# Patient Record
Sex: MALE | Race: WHITE | Employment: OTHER | URBAN - METROPOLITAN AREA
[De-identification: names, ages, dates, MRNs, and addresses within clinical notes are randomized per-mention and may not be internally consistent; named-entity substitution may affect disease eponyms.]

---

## 2017-05-31 ENCOUNTER — OFFICE VISIT (OUTPATIENT)
Dept: FAMILY MEDICINE CLINIC | Age: 67
End: 2017-05-31

## 2017-05-31 VITALS
HEIGHT: 71 IN | WEIGHT: 188.2 LBS | OXYGEN SATURATION: 98 % | DIASTOLIC BLOOD PRESSURE: 76 MMHG | RESPIRATION RATE: 12 BRPM | BODY MASS INDEX: 26.35 KG/M2 | HEART RATE: 78 BPM | TEMPERATURE: 97.1 F | SYSTOLIC BLOOD PRESSURE: 118 MMHG

## 2017-05-31 DIAGNOSIS — J01.10 ACUTE NON-RECURRENT FRONTAL SINUSITIS: Primary | ICD-10-CM

## 2017-05-31 DIAGNOSIS — R05.9 COUGH: ICD-10-CM

## 2017-05-31 PROCEDURE — 99213 OFFICE O/P EST LOW 20 MIN: CPT | Performed by: NURSE PRACTITIONER

## 2017-05-31 PROCEDURE — 3288F FALL RISK ASSESSMENT DOCD: CPT | Performed by: NURSE PRACTITIONER

## 2017-05-31 PROCEDURE — G8510 SCR DEP NEG, NO PLAN REQD: HCPCS | Performed by: NURSE PRACTITIONER

## 2017-05-31 RX ORDER — AMOXICILLIN 500 MG/1
500 CAPSULE ORAL 2 TIMES DAILY
Qty: 14 CAPSULE | Refills: 0 | Status: SHIPPED | OUTPATIENT
Start: 2017-05-31 | End: 2017-06-07

## 2017-05-31 ASSESSMENT — ENCOUNTER SYMPTOMS
COUGH: 1
NAUSEA: 0
BACK PAIN: 0
CHEST TIGHTNESS: 0
VOMITING: 0
SINUS PRESSURE: 1

## 2017-05-31 ASSESSMENT — PATIENT HEALTH QUESTIONNAIRE - PHQ9
1. LITTLE INTEREST OR PLEASURE IN DOING THINGS: 0
SUM OF ALL RESPONSES TO PHQ QUESTIONS 1-9: 0
2. FEELING DOWN, DEPRESSED OR HOPELESS: 0
SUM OF ALL RESPONSES TO PHQ9 QUESTIONS 1 & 2: 0

## 2017-10-24 ENCOUNTER — OFFICE VISIT (OUTPATIENT)
Dept: FAMILY MEDICINE CLINIC | Age: 67
End: 2017-10-24

## 2017-10-24 VITALS
DIASTOLIC BLOOD PRESSURE: 60 MMHG | SYSTOLIC BLOOD PRESSURE: 104 MMHG | HEART RATE: 76 BPM | BODY MASS INDEX: 25.24 KG/M2 | WEIGHT: 181 LBS

## 2017-10-24 DIAGNOSIS — Z12.5 PROSTATE CANCER SCREENING: ICD-10-CM

## 2017-10-24 DIAGNOSIS — Z13.220 LIPID SCREENING: ICD-10-CM

## 2017-10-24 DIAGNOSIS — Z11.59 NEED FOR HEPATITIS C SCREENING TEST: ICD-10-CM

## 2017-10-24 DIAGNOSIS — K21.00 GASTROESOPHAGEAL REFLUX DISEASE WITH ESOPHAGITIS: Primary | ICD-10-CM

## 2017-10-24 DIAGNOSIS — Z23 NEED FOR PNEUMOCOCCAL VACCINATION: ICD-10-CM

## 2017-10-24 PROCEDURE — 36415 COLL VENOUS BLD VENIPUNCTURE: CPT | Performed by: FAMILY MEDICINE

## 2017-10-24 PROCEDURE — 90732 PPSV23 VACC 2 YRS+ SUBQ/IM: CPT | Performed by: FAMILY MEDICINE

## 2017-10-24 PROCEDURE — 99213 OFFICE O/P EST LOW 20 MIN: CPT | Performed by: FAMILY MEDICINE

## 2017-10-24 PROCEDURE — G0009 ADMIN PNEUMOCOCCAL VACCINE: HCPCS | Performed by: FAMILY MEDICINE

## 2017-10-24 RX ORDER — RANITIDINE 150 MG/1
150 TABLET ORAL 2 TIMES DAILY
Qty: 180 TABLET | Refills: 3 | Status: SHIPPED | OUTPATIENT
Start: 2017-10-24 | End: 2018-10-29 | Stop reason: SDUPTHER

## 2017-10-24 NOTE — PROGRESS NOTES
Subjective:      Patient ID: Lizet Mahoney is a 79 y.o. male. HPI FU for GERD, also some recent intermittent left knee pain. On the porch roof of his stepdaughter for a few weeks, now finally finished with the roof portion. Plebe wrestling - cartilage tear of knee. Energy ok, sleeping not great, for years. No nocturia. Diet - little beef, veggies/fruits, some fast foods, some sweets, pop (8-10 regular oz./day). No FH of prostate CA. Close friend at [facility] with metastatic prostate. Flu shots - defers. Colonoscopy  scheduled in 3 weeks - first one. Review of Systems   Constitutional: Negative for chills and fatigue. Respiratory: Negative for shortness of breath. Cardiovascular: Negative for chest pain. Gastrointestinal: Negative for abdominal pain. Musculoskeletal: Positive for arthralgias. Negative for gait problem. Psychiatric/Behavioral: Negative for dysphoric mood. The patient is not nervous/anxious. Objective:   Physical Exam   Constitutional: He appears well-developed and well-nourished. Cardiovascular: Normal rate, regular rhythm and normal heart sounds. Pulmonary/Chest: Effort normal and breath sounds normal. No respiratory distress. Neurological: He is alert. Nursing note and vitals reviewed. Assessment:      Encounter Diagnoses   Name Primary?  Gastroesophageal reflux disease with esophagitis Yes    Lipid screening     Prostate cancer screening     Need for pneumococcal vaccination     Need for hepatitis C screening test          Plan:      Per orders - await results. GERD stable, continue present medication. If labs stable, and overall feeling well, then repeat office visit in one year, sooner as needed. Advised patient get colonoscopy as scheduled within the next few weeks.

## 2017-10-25 LAB
CHOLESTEROL, TOTAL: 155 MG/DL (ref 0–199)
HDLC SERPL-MCNC: 48 MG/DL (ref 40–60)
HEPATITIS C ANTIBODY INTERPRETATION: NORMAL
LDL CHOLESTEROL CALCULATED: 80 MG/DL
PROSTATE SPECIFIC ANTIGEN: 0.52 NG/ML (ref 0–4)
TRIGL SERPL-MCNC: 137 MG/DL (ref 0–150)
VLDLC SERPL CALC-MCNC: 27 MG/DL

## 2017-10-25 ASSESSMENT — ENCOUNTER SYMPTOMS
SHORTNESS OF BREATH: 0
ABDOMINAL PAIN: 0

## 2018-10-29 ENCOUNTER — OFFICE VISIT (OUTPATIENT)
Dept: FAMILY MEDICINE CLINIC | Age: 68
End: 2018-10-29
Payer: MEDICARE

## 2018-10-29 VITALS
OXYGEN SATURATION: 99 % | WEIGHT: 175.2 LBS | HEART RATE: 64 BPM | SYSTOLIC BLOOD PRESSURE: 130 MMHG | HEIGHT: 71 IN | BODY MASS INDEX: 24.53 KG/M2 | DIASTOLIC BLOOD PRESSURE: 67 MMHG

## 2018-10-29 DIAGNOSIS — K21.00 GASTROESOPHAGEAL REFLUX DISEASE WITH ESOPHAGITIS: ICD-10-CM

## 2018-10-29 PROCEDURE — 99213 OFFICE O/P EST LOW 20 MIN: CPT | Performed by: FAMILY MEDICINE

## 2018-10-29 RX ORDER — RANITIDINE 150 MG/1
150 TABLET ORAL 2 TIMES DAILY
Qty: 180 TABLET | Refills: 3 | Status: SHIPPED | OUTPATIENT
Start: 2018-10-29 | End: 2019-10-28 | Stop reason: SDUPTHER

## 2018-10-29 ASSESSMENT — PATIENT HEALTH QUESTIONNAIRE - PHQ9
2. FEELING DOWN, DEPRESSED OR HOPELESS: 0
SUM OF ALL RESPONSES TO PHQ QUESTIONS 1-9: 0
SUM OF ALL RESPONSES TO PHQ QUESTIONS 1-9: 0
SUM OF ALL RESPONSES TO PHQ9 QUESTIONS 1 & 2: 0
1. LITTLE INTEREST OR PLEASURE IN DOING THINGS: 0

## 2018-10-29 NOTE — PROGRESS NOTES
Subjective:      Patient ID: Nakul Byrd is a 76 y.o. male. HPI   FU for GERD. Lost 20 lbs this year, now within 5 lbs of his goal weight. What has helped is not overeating, so not as much reflux, smaller portions. Occasional GERD overnight - zantac 150, usually twice daily with meals. Certain foods - tomato sauce, also spicey stuff. Looser bowels from it, no other major side effects. Got another summer (August) 2018. ASA for pain only. The 10-year ASCVD risk score (Evie Burgos, et al., 2013) is: 14.3%    Values used to calculate the score:      Age: 76 years      Sex: Male      Is Non- : No      Diabetic: No      Tobacco smoker: No      Systolic Blood Pressure: 495 mmHg      Is BP treated: No      HDL Cholesterol: 48 mg/dL      Total Cholesterol: 155 mg/dL      Review of Systems   Constitutional: Negative for chills and fatigue. Respiratory: Negative for shortness of breath. Cardiovascular: Negative for chest pain. Gastrointestinal: Negative for abdominal pain. Objective:   Physical Exam   Constitutional: He appears well-developed and well-nourished. Cardiovascular: Normal rate, regular rhythm and normal heart sounds. Pulmonary/Chest: Effort normal and breath sounds normal. No respiratory distress. Neurological: He is alert. Psychiatric: He has a normal mood and affect. Nursing note and vitals reviewed. Assessment:      Encounter Diagnosis   Name Primary?  Gastroesophageal reflux disease with esophagitis          Plan:       GERD stable, continue Zantac. If overall feeling well, advise repeat office visit in one year, sooner as needed. Due to elevated CVD risk, advised he consider statin, though patient presently refuses.          Francesca Wilson MD

## 2018-10-30 ASSESSMENT — ENCOUNTER SYMPTOMS
ABDOMINAL PAIN: 0
SHORTNESS OF BREATH: 0

## 2018-11-30 ENCOUNTER — OFFICE VISIT (OUTPATIENT)
Dept: FAMILY MEDICINE CLINIC | Age: 68
End: 2018-11-30
Payer: MEDICARE

## 2018-11-30 VITALS
WEIGHT: 175.6 LBS | SYSTOLIC BLOOD PRESSURE: 122 MMHG | BODY MASS INDEX: 24.58 KG/M2 | OXYGEN SATURATION: 95 % | DIASTOLIC BLOOD PRESSURE: 70 MMHG | HEART RATE: 81 BPM | HEIGHT: 71 IN

## 2018-11-30 DIAGNOSIS — M25.561 ACUTE PAIN OF RIGHT KNEE: ICD-10-CM

## 2018-11-30 DIAGNOSIS — Z01.818 PRE-OP EXAM: Primary | ICD-10-CM

## 2018-11-30 LAB
ANION GAP SERPL CALCULATED.3IONS-SCNC: 9 MMOL/L (ref 3–16)
BILIRUBIN URINE: NEGATIVE
BLOOD, URINE: NEGATIVE
BUN BLDV-MCNC: 16 MG/DL (ref 7–20)
CALCIUM SERPL-MCNC: 9 MG/DL (ref 8.3–10.6)
CHLORIDE BLD-SCNC: 106 MMOL/L (ref 99–110)
CLARITY: CLEAR
CO2: 28 MMOL/L (ref 21–32)
COLOR: YELLOW
CREAT SERPL-MCNC: 1 MG/DL (ref 0.8–1.3)
GFR AFRICAN AMERICAN: >60
GFR NON-AFRICAN AMERICAN: >60
GLUCOSE BLD-MCNC: 67 MG/DL (ref 70–99)
GLUCOSE URINE: NEGATIVE MG/DL
HCT VFR BLD CALC: 43 % (ref 40.5–52.5)
HEMOGLOBIN: 14.2 G/DL (ref 13.5–17.5)
KETONES, URINE: NEGATIVE MG/DL
LEUKOCYTE ESTERASE, URINE: NEGATIVE
MCH RBC QN AUTO: 32.6 PG (ref 26–34)
MCHC RBC AUTO-ENTMCNC: 33.1 G/DL (ref 31–36)
MCV RBC AUTO: 98.5 FL (ref 80–100)
MICROSCOPIC EXAMINATION: NORMAL
NITRITE, URINE: NEGATIVE
PDW BLD-RTO: 13.3 % (ref 12.4–15.4)
PH UA: 6
PLATELET # BLD: 128 K/UL (ref 135–450)
PMV BLD AUTO: 9 FL (ref 5–10.5)
POTASSIUM SERPL-SCNC: 4.7 MMOL/L (ref 3.5–5.1)
PROTEIN UA: NEGATIVE MG/DL
RBC # BLD: 4.37 M/UL (ref 4.2–5.9)
SODIUM BLD-SCNC: 143 MMOL/L (ref 136–145)
SPECIFIC GRAVITY UA: 1.02
URINE TYPE: NORMAL
UROBILINOGEN, URINE: 0.2 E.U./DL
WBC # BLD: 17.6 K/UL (ref 4–11)

## 2018-11-30 PROCEDURE — 93000 ELECTROCARDIOGRAM COMPLETE: CPT | Performed by: PHYSICIAN ASSISTANT

## 2018-11-30 PROCEDURE — 99213 OFFICE O/P EST LOW 20 MIN: CPT | Performed by: PHYSICIAN ASSISTANT

## 2018-11-30 PROCEDURE — 36415 COLL VENOUS BLD VENIPUNCTURE: CPT | Performed by: PHYSICIAN ASSISTANT

## 2018-11-30 PROCEDURE — 81003 URINALYSIS AUTO W/O SCOPE: CPT | Performed by: PHYSICIAN ASSISTANT

## 2018-11-30 NOTE — PROGRESS NOTES
negative  ENDOCRINE:  negative  MUSCULOSKELETAL:  negative  NEUROLOGICAL:  negative    PHYSICAL EXAM:      /70 (Site: Right Upper Arm, Position: Sitting)   Pulse 81   Ht 5' 11\" (1.803 m)   Wt 175 lb 9.6 oz (79.7 kg)   SpO2 95%   BMI 24.49 kg/m²     Eyes:  Lids and lashes normal, pupils equal, round and reactive to light, extra ocular muscles intact, sclera clear, conjunctiva normal  Head/ENT:  Normocephalic, without obvious abnormality, atraumatic, sinuses nontender on palpation, external ears without lesions, oral pharynx with moist mucus membranes, tonsils without erythema or exudates, gums normal and good dentition. Neck:  Supple, symmetrical, trachea midline, no adenopathy, thyroid symmetric, not enlarged and no tenderness, skin normal  Heart:  Normal apical impulse, regular rate and rhythm, normal S1 and S2, no S3 or S4, and no murmur noted  Lungs:  No increased work of breathing, good air exchange, clear to auscultation bilaterally, no crackles or wheezing  Abdomen:  No scars, normal bowel sounds, soft, non-distended, non-tender, no masses palpated, no hepatosplenomegally  Extremities:  No clubbing, cyanosis, or edema      DATA:  EKG: see attached    ASSESSMENT AND PLAN:    1. Patient is a 76 y.o. male with above specified procedure planned on 12/20/18 with Dr. Gordon Burkett at [facility]. 2. Stop asa and nsaid medications 7-10 days prior to procedure. Parish Guzmán is Medically stable for surgery. Report will be faxed.

## 2018-12-02 LAB — URINE CULTURE, ROUTINE: NORMAL

## 2018-12-03 DIAGNOSIS — D72.829 LEUKOCYTOSIS, UNSPECIFIED TYPE: Primary | ICD-10-CM

## 2018-12-12 ENCOUNTER — HOSPITAL ENCOUNTER (OUTPATIENT)
Age: 68
Discharge: HOME | End: 2018-12-12
Payer: MEDICARE

## 2018-12-12 DIAGNOSIS — D72.829 LEUKOCYTOSIS, UNSPECIFIED TYPE: ICD-10-CM

## 2018-12-12 PROCEDURE — 85025 COMPLETE CBC W/AUTO DIFF WBC: CPT

## 2018-12-12 PROCEDURE — 36415 COLL VENOUS BLD VENIPUNCTURE: CPT

## 2018-12-13 LAB
BASOPHILS ABSOLUTE: 0 K/UL (ref 0–0.2)
BASOPHILS RELATIVE PERCENT: 0 %
EOSINOPHILS ABSOLUTE: 0 K/UL (ref 0–0.6)
EOSINOPHILS RELATIVE PERCENT: 0 %
HCT VFR BLD CALC: 42.2 % (ref 40.5–52.5)
HEMATOLOGY PATH CONSULT: NORMAL
HEMATOLOGY PATH CONSULT: YES
HEMOGLOBIN: 14.1 G/DL (ref 13.5–17.5)
LYMPHOCYTES ABSOLUTE: 14.5 K/UL (ref 1–5.1)
LYMPHOCYTES RELATIVE PERCENT: 80 %
MCH RBC QN AUTO: 32.7 PG (ref 26–34)
MCHC RBC AUTO-ENTMCNC: 33.4 G/DL (ref 31–36)
MCV RBC AUTO: 97.9 FL (ref 80–100)
MONOCYTES ABSOLUTE: 0.5 K/UL (ref 0–1.3)
MONOCYTES RELATIVE PERCENT: 3 %
NEUTROPHILS ABSOLUTE: 3.1 K/UL (ref 1.7–7.7)
NEUTROPHILS RELATIVE PERCENT: 17 %
PDW BLD-RTO: 12.8 % (ref 12.4–15.4)
PLATELET # BLD: 130 K/UL (ref 135–450)
PMV BLD AUTO: 9.2 FL (ref 5–10.5)
RBC # BLD: 4.31 M/UL (ref 4.2–5.9)
SMUDGE CELLS: PRESENT
WBC # BLD: 18.1 K/UL (ref 4–11)

## 2018-12-14 ENCOUNTER — TELEPHONE (OUTPATIENT)
Dept: FAMILY MEDICINE CLINIC | Age: 68
End: 2018-12-14

## 2018-12-14 NOTE — TELEPHONE ENCOUNTER
Patient called back regarding the Hematologist.  He can do Monday, his schedule has freed up. Sent to [facility] [place] as well.

## 2019-10-28 ENCOUNTER — OFFICE VISIT (OUTPATIENT)
Dept: FAMILY MEDICINE CLINIC | Age: 69
End: 2019-10-28
Payer: MEDICARE

## 2019-10-28 VITALS
HEIGHT: 71 IN | DIASTOLIC BLOOD PRESSURE: 66 MMHG | BODY MASS INDEX: 23.85 KG/M2 | SYSTOLIC BLOOD PRESSURE: 120 MMHG | WEIGHT: 170.4 LBS | TEMPERATURE: 97.7 F | OXYGEN SATURATION: 99 % | HEART RATE: 53 BPM

## 2019-10-28 DIAGNOSIS — C91.10 CLL (CHRONIC LYMPHOCYTIC LEUKEMIA) (HCC): ICD-10-CM

## 2019-10-28 DIAGNOSIS — K21.00 GASTROESOPHAGEAL REFLUX DISEASE WITH ESOPHAGITIS: Primary | ICD-10-CM

## 2019-10-28 DIAGNOSIS — Z12.5 PROSTATE CANCER SCREENING: ICD-10-CM

## 2019-10-28 DIAGNOSIS — Z13.220 LIPID SCREENING: ICD-10-CM

## 2019-10-28 LAB
CHOLESTEROL, TOTAL: 150 MG/DL (ref 0–199)
HDLC SERPL-MCNC: 56 MG/DL (ref 40–60)
LDL CHOLESTEROL CALCULATED: 83 MG/DL
PROSTATE SPECIFIC ANTIGEN: 0.58 NG/ML (ref 0–4)
TRIGL SERPL-MCNC: 54 MG/DL (ref 0–150)
VLDLC SERPL CALC-MCNC: 11 MG/DL

## 2019-10-28 PROCEDURE — 36415 COLL VENOUS BLD VENIPUNCTURE: CPT | Performed by: FAMILY MEDICINE

## 2019-10-28 PROCEDURE — 99213 OFFICE O/P EST LOW 20 MIN: CPT | Performed by: FAMILY MEDICINE

## 2019-10-28 RX ORDER — RANITIDINE 150 MG/1
150 TABLET ORAL 2 TIMES DAILY
Qty: 180 TABLET | Refills: 3 | Status: SHIPPED | OUTPATIENT
Start: 2019-10-28 | End: 2020-05-27

## 2019-10-28 ASSESSMENT — PATIENT HEALTH QUESTIONNAIRE - PHQ9
SUM OF ALL RESPONSES TO PHQ QUESTIONS 1-9: 0
SUM OF ALL RESPONSES TO PHQ9 QUESTIONS 1 & 2: 0
1. LITTLE INTEREST OR PLEASURE IN DOING THINGS: 0
SUM OF ALL RESPONSES TO PHQ QUESTIONS 1-9: 0
2. FEELING DOWN, DEPRESSED OR HOPELESS: 0

## 2019-10-29 PROBLEM — Z13.220 LIPID SCREENING: Status: ACTIVE | Noted: 2019-10-29

## 2019-10-29 PROBLEM — K21.00 GASTROESOPHAGEAL REFLUX DISEASE WITH ESOPHAGITIS: Status: ACTIVE | Noted: 2019-10-29

## 2019-10-29 PROBLEM — C91.10 CLL (CHRONIC LYMPHOCYTIC LEUKEMIA) (HCC): Status: ACTIVE | Noted: 2019-10-29

## 2019-10-29 ASSESSMENT — ENCOUNTER SYMPTOMS
SHORTNESS OF BREATH: 0
ABDOMINAL PAIN: 0

## 2019-11-04 DIAGNOSIS — Z12.11 COLON CANCER SCREENING: Primary | ICD-10-CM

## 2019-11-04 LAB
CONTROL: NORMAL
HEMOCCULT STL QL: NEGATIVE

## 2019-11-04 PROCEDURE — 82274 ASSAY TEST FOR BLOOD FECAL: CPT | Performed by: FAMILY MEDICINE

## 2020-05-19 ENCOUNTER — TELEPHONE (OUTPATIENT)
Dept: FAMILY MEDICINE CLINIC | Age: 70
End: 2020-05-19

## 2020-05-19 RX ORDER — FAMOTIDINE 20 MG/1
20 TABLET, FILM COATED ORAL 2 TIMES DAILY
Qty: 180 TABLET | Refills: 2 | Status: SHIPPED | OUTPATIENT
Start: 2020-05-19 | End: 2021-01-18 | Stop reason: SDUPTHER

## 2020-05-27 ENCOUNTER — OFFICE VISIT (OUTPATIENT)
Dept: FAMILY MEDICINE CLINIC | Age: 70
End: 2020-05-27
Payer: MEDICARE

## 2020-05-27 VITALS
RESPIRATION RATE: 18 BRPM | HEART RATE: 57 BPM | SYSTOLIC BLOOD PRESSURE: 124 MMHG | OXYGEN SATURATION: 94 % | BODY MASS INDEX: 23.85 KG/M2 | TEMPERATURE: 97.3 F | WEIGHT: 171 LBS | DIASTOLIC BLOOD PRESSURE: 70 MMHG

## 2020-05-27 PROCEDURE — 99213 OFFICE O/P EST LOW 20 MIN: CPT | Performed by: NURSE PRACTITIONER

## 2020-05-27 ASSESSMENT — PATIENT HEALTH QUESTIONNAIRE - PHQ9
SUM OF ALL RESPONSES TO PHQ QUESTIONS 1-9: 0
SUM OF ALL RESPONSES TO PHQ QUESTIONS 1-9: 0
SUM OF ALL RESPONSES TO PHQ9 QUESTIONS 1 & 2: 0
1. LITTLE INTEREST OR PLEASURE IN DOING THINGS: 0
2. FEELING DOWN, DEPRESSED OR HOPELESS: 0

## 2020-05-27 ASSESSMENT — ENCOUNTER SYMPTOMS
TROUBLE SWALLOWING: 0
NAUSEA: 0
CHEST TIGHTNESS: 0
BACK PAIN: 0
DIARRHEA: 0
SHORTNESS OF BREATH: 0
COLOR CHANGE: 1

## 2020-05-27 NOTE — PROGRESS NOTES
meSubjective:      Patient ID: Ania Padilla is a 79 y.o. male. HPI     To establish care at this practice. Previous PCP Dr. Godfrey Fairchild, relocated to the [place]. Cut left thumb on saw Monday and was treated at Urgent Care by Jose. Taking keflex QID and feels is healing. Late in visit states has another problem. Left index finger with splinter from board imbedded about 3 weeks ago. Tried to removed but unable. Painful when touched specific way. Denies redness, drainage. Review of Systems   Constitutional: Negative for chills, fever and unexpected weight change. HENT: Negative for trouble swallowing. Respiratory: Negative for chest tightness and shortness of breath. Cardiovascular: Negative for chest pain and palpitations. Gastrointestinal: Negative for diarrhea and nausea. Genitourinary: Negative for difficulty urinating. Musculoskeletal: Negative for arthralgias, back pain and gait problem. Skin: Positive for color change and wound. Neurological: Negative for dizziness and headaches. Psychiatric/Behavioral: Negative. Objective:   Physical Exam  Constitutional:       General: He is not in acute distress. Appearance: Normal appearance. He is well-developed. Cardiovascular:      Rate and Rhythm: Normal rate and regular rhythm. Heart sounds: Normal heart sounds. Pulmonary:      Effort: Pulmonary effort is normal.      Breath sounds: Normal breath sounds. Abdominal:      General: Bowel sounds are normal.      Palpations: Abdomen is soft. Musculoskeletal: Normal range of motion. Skin:     General: Skin is warm and dry. Comments: Dressing Dry and Intact Left thumb. Left index finger with firm calloused raised area mid finger. White in appearance, tender to palpation. ROM joint intact. Neurological:      Mental Status: He is alert and oriented to person, place, and time. Assessment:      1. CLL-dx 2018, following with Dr. Sylvester Cavanaugh every 3 months.

## 2020-06-05 ENCOUNTER — OFFICE VISIT (OUTPATIENT)
Dept: PRIMARY CARE CLINIC | Age: 70
End: 2020-06-05

## 2020-06-05 ENCOUNTER — TELEPHONE (OUTPATIENT)
Dept: ORTHOPEDIC SURGERY | Age: 70
End: 2020-06-05

## 2020-06-05 ENCOUNTER — OFFICE VISIT (OUTPATIENT)
Dept: ORTHOPEDIC SURGERY | Age: 70
End: 2020-06-05
Payer: MEDICARE

## 2020-06-05 VITALS — WEIGHT: 171 LBS | RESPIRATION RATE: 17 BRPM | HEIGHT: 71 IN | BODY MASS INDEX: 23.94 KG/M2

## 2020-06-05 PROBLEM — S60.459A: Status: ACTIVE | Noted: 2020-06-05

## 2020-06-05 PROBLEM — L08.9: Status: ACTIVE | Noted: 2020-06-05

## 2020-06-05 PROCEDURE — 99203 OFFICE O/P NEW LOW 30 MIN: CPT | Performed by: ORTHOPAEDIC SURGERY

## 2020-06-05 NOTE — TELEPHONE ENCOUNTER
Auth: NPR  Date: 6/10/2020  Reference # [id number]  Spoke with: IVR  Type of SX: Outpatient  Location: [facility] [address]   SX area: [facility]

## 2020-06-05 NOTE — PROGRESS NOTES
healing wound with satisfactory granulation bed, and then over the PIP radial level of the left volar index. This area has some mild redness and an old entrance wound but there is a prominence along the volar PIP area. Palpation: There is tenderness mostly as I palpate over the area of the index volar PIP where I believe there is a prominence which is obliquely oriented across the volar PIP joint line    Range of Motion: There is satisfactory range of motion the finger but tenderness with flexion. Strength: Normal    Special Tests: There is no dramatic pain proximally along the flexor tendon sheath or into the palm of the hand    Skin: There are no additional worrisome rashes, ulcerations or lesions. Gait: normal    Circulation: well perfused        Additional Comments:     Additional Examinations:  Right Upper Extremity:  Examination of the right upper extremity does not show any tenderness, deformity or injury. Range of motion is unremarkable. There is no gross instability. There are no rashes, ulcerations or lesions. Strength and tone are normal.      Radiology:     X-rays obtained and reviewed in office:  Views 3  Location left index  Impression x-rays demonstrate some mild degenerative changes but no signs of any scalloping of the bony cortex or sign of fracture           Assessment: I believe the patient does have a retained deep foreign body in the volar aspect of left index finger and also laceration to the left thumb    Impression:   Encounter Diagnoses   Name Primary?     Finger pain, left Yes    Foreign body in finger-infected, initial encounter        Office Procedures:  Orders Placed This Encounter   Procedures    XR FINGER LEFT (MIN 2 VIEWS)     Standing Status:   Future     Number of Occurrences:   1     Standing Expiration Date:   6/5/2021       Treatment Plan: I believe the left thumb laceration will actually heal very nicely with granulation tissue, but I am continue to be concerned about the deep retained foreign body which is likely a wood splinter at the left index finger. I have informed the patient that this does carry a concern about extension with flexor tendon sheath infections which can develop rapidly and become very concerning. With this in mind I have recommended we plan for outpatient removal of the deep foreign body in the very near future. I do believe this could be done as a straight local procedure. We discussed the risks, benefits, limitations, alternatives, and postop recovery following the proposed procedure. We will begin the process of scheduling surgery if there are no other preoperative medical contraindications. Please note that this transcription was created using voice recognition software. Any errors are unintentional and may be due to voice recognition transcription.

## 2020-06-07 LAB
SARS-COV-2: NOT DETECTED
SOURCE: NORMAL

## 2020-06-08 NOTE — RESULT ENCOUNTER NOTE
Please contact patient with their testing results: Your test for COVID-19, also known as novel coronavirus, came back negative. No virus was detected from the sample collected. Until your symptoms are fully resolved, you may still be contagious. We recommend that you remain isolated for 7 days minimum or 72 hours after your symptoms have completely resolved, whichever is longer. Continually monitor symptoms. Contact a medical provider if symptoms are worsening. If you have any additional questions, contact your PCP.     For additional information, please visit the Centers for Disease Control and Prevention   [email or web address]

## 2020-06-09 ENCOUNTER — TELEPHONE (OUTPATIENT)
Dept: ORTHOPEDIC SURGERY | Age: 70
End: 2020-06-09

## 2021-01-18 ENCOUNTER — OFFICE VISIT (OUTPATIENT)
Dept: FAMILY MEDICINE CLINIC | Age: 71
End: 2021-01-18
Payer: MEDICARE

## 2021-01-18 VITALS
TEMPERATURE: 97.5 F | WEIGHT: 173.8 LBS | RESPIRATION RATE: 16 BRPM | HEART RATE: 50 BPM | BODY MASS INDEX: 24.24 KG/M2 | SYSTOLIC BLOOD PRESSURE: 112 MMHG | OXYGEN SATURATION: 98 % | DIASTOLIC BLOOD PRESSURE: 68 MMHG

## 2021-01-18 DIAGNOSIS — K21.00 GASTROESOPHAGEAL REFLUX DISEASE WITH ESOPHAGITIS WITHOUT HEMORRHAGE: Primary | ICD-10-CM

## 2021-01-18 DIAGNOSIS — C91.10 CLL (CHRONIC LYMPHOCYTIC LEUKEMIA) (HCC): ICD-10-CM

## 2021-01-18 PROCEDURE — 99213 OFFICE O/P EST LOW 20 MIN: CPT | Performed by: NURSE PRACTITIONER

## 2021-01-18 PROCEDURE — G8510 SCR DEP NEG, NO PLAN REQD: HCPCS | Performed by: NURSE PRACTITIONER

## 2021-01-18 RX ORDER — FAMOTIDINE 20 MG/1
20 TABLET, FILM COATED ORAL NIGHTLY
Qty: 90 TABLET | Refills: 3 | Status: SHIPPED | OUTPATIENT
Start: 2021-01-18 | End: 2021-05-27 | Stop reason: SDUPTHER

## 2021-01-18 RX ORDER — OMEPRAZOLE 40 MG/1
40 CAPSULE, DELAYED RELEASE ORAL
Qty: 90 CAPSULE | Refills: 3 | Status: SHIPPED | OUTPATIENT
Start: 2021-01-18 | End: 2021-05-27

## 2021-01-18 ASSESSMENT — PATIENT HEALTH QUESTIONNAIRE - PHQ9
2. FEELING DOWN, DEPRESSED OR HOPELESS: 0
SUM OF ALL RESPONSES TO PHQ QUESTIONS 1-9: 0

## 2021-01-18 NOTE — PROGRESS NOTES
Jada Jimenez (:  1950) is a 79 y.o. male,Established patient, here for evaluation of the following chief complaint(s):  Follow-up      ASSESSMENT/PLAN:  1. Gastroesophageal reflux disease with esophagitis without hemorrhage  -     omeprazole (PRILOSEC) 40 MG delayed release capsule; Take 1 capsule by mouth every morning (before breakfast), Disp-90 capsule, R-3Normal  -     famotidine (PEPCID) 20 MG tablet; Take 1 tablet by mouth nightly, Disp-90 tablet, R-3Normal  2. CLL (chronic lymphocytic leukemia) (HCC)    Avoid caffeine, peppermint, alcohol  Avoid eating within 3 hours of lying down  Limit fatty food, this is harder to digest  Avoid acidy foods such as orange juice, grapefruit, tomato juice products    Return in about 6 months (around 2021) for gerd. SUBJECTIVE/OBJECTIVE:  HPI       For routine follow up. Feeling well. Hx of CLL managed by Dr. Alyssa Wiley. Now following every 6 months. Labs reported to be stable on last office visit. Review of Systems   All other systems reviewed and are negative. Physical Exam  Constitutional:       General: He is not in acute distress. Appearance: Normal appearance. He is well-developed. Cardiovascular:      Rate and Rhythm: Normal rate and regular rhythm. Heart sounds: Normal heart sounds. Pulmonary:      Effort: Pulmonary effort is normal.      Breath sounds: Normal breath sounds. Abdominal:      General: Bowel sounds are normal.      Palpations: Abdomen is soft. Musculoskeletal: Normal range of motion. Skin:     General: Skin is warm and dry. Neurological:      Mental Status: He is alert and oriented to person, place, and time.                  An electronic signature was used to authenticate this note.    --AMERICA HARDEN, APRN - CNP

## 2021-03-16 ENCOUNTER — TELEPHONE (OUTPATIENT)
Dept: FAMILY MEDICINE CLINIC | Age: 71
End: 2021-03-16

## 2021-03-16 ENCOUNTER — OFFICE VISIT (OUTPATIENT)
Dept: FAMILY MEDICINE CLINIC | Age: 71
End: 2021-03-16
Payer: MEDICARE

## 2021-03-16 ENCOUNTER — HOSPITAL ENCOUNTER (OUTPATIENT)
Dept: CT IMAGING | Age: 71
Discharge: HOME | End: 2021-03-16
Payer: MEDICARE

## 2021-03-16 VITALS
WEIGHT: 173.2 LBS | OXYGEN SATURATION: 98 % | RESPIRATION RATE: 16 BRPM | DIASTOLIC BLOOD PRESSURE: 74 MMHG | SYSTOLIC BLOOD PRESSURE: 106 MMHG | HEIGHT: 71 IN | HEART RATE: 77 BPM | TEMPERATURE: 96.9 F | BODY MASS INDEX: 24.25 KG/M2

## 2021-03-16 DIAGNOSIS — R10.31 RIGHT LOWER QUADRANT PAIN: ICD-10-CM

## 2021-03-16 DIAGNOSIS — M54.41 ACUTE RIGHT-SIDED LOW BACK PAIN WITH RIGHT-SIDED SCIATICA: Primary | ICD-10-CM

## 2021-03-16 LAB
BILIRUBIN, POC: NORMAL
BLOOD URINE, POC: NORMAL
CLARITY, POC: CLEAR
COLOR, POC: YELLOW
GFR AFRICAN AMERICAN: >60
GFR NON-AFRICAN AMERICAN: >60
GLUCOSE URINE, POC: NORMAL
KETONES, POC: NORMAL
LEUKOCYTE EST, POC: NORMAL
NITRITE, POC: NORMAL
PERFORMED ON: NORMAL
PH, POC: 6
POC CREATININE: 1 MG/DL (ref 0.8–1.3)
POC SAMPLE TYPE: NORMAL
PROTEIN, POC: NORMAL
SPECIFIC GRAVITY, POC: >=1.03
UROBILINOGEN, POC: 0.2

## 2021-03-16 PROCEDURE — 99214 OFFICE O/P EST MOD 30 MIN: CPT | Performed by: NURSE PRACTITIONER

## 2021-03-16 PROCEDURE — 82565 ASSAY OF CREATININE: CPT

## 2021-03-16 PROCEDURE — 81002 URINALYSIS NONAUTO W/O SCOPE: CPT | Performed by: NURSE PRACTITIONER

## 2021-03-16 PROCEDURE — 74177 CT ABD & PELVIS W/CONTRAST: CPT

## 2021-03-16 PROCEDURE — 6360000004 HC RX CONTRAST MEDICATION: Performed by: NURSE PRACTITIONER

## 2021-03-16 RX ADMIN — IOPAMIDOL 75 ML: 755 INJECTION, SOLUTION INTRAVENOUS at 12:50

## 2021-03-16 RX ADMIN — IOHEXOL 50 ML: 240 INJECTION, SOLUTION INTRATHECAL; INTRAVASCULAR; INTRAVENOUS; ORAL at 12:42

## 2021-03-16 ASSESSMENT — ENCOUNTER SYMPTOMS
RESPIRATORY NEGATIVE: 1
BACK PAIN: 1
CONSTIPATION: 0
NAUSEA: 0
ABDOMINAL DISTENTION: 0
DIARRHEA: 0
ABDOMINAL PAIN: 1
VOMITING: 0
BLOOD IN STOOL: 0
RECTAL PAIN: 0
ANAL BLEEDING: 0

## 2021-03-16 NOTE — TELEPHONE ENCOUNTER
[facility] reporting that CT Abdominal and Pelvis is complete and resulted in imaging tab.

## 2021-03-16 NOTE — PROGRESS NOTES
3/16/2021     Wellington Gonzalez (:  1950) is a 79 y.o. male, here for evaluation of the following medical concerns:    HPI  Pt c/o right lower back pain that radiates into his RLQ and down his right let. Started 3 weeks ago. Pain is constant, but does get sharp pains intermittently. Pain is worse when standing. Denies any urinary symptoms. No known injury. Review of Systems   Constitutional: Negative. Respiratory: Negative. Cardiovascular: Negative. Gastrointestinal: Positive for abdominal pain. Negative for abdominal distention, anal bleeding, blood in stool, constipation, diarrhea, nausea, rectal pain and vomiting. Genitourinary: Negative. Musculoskeletal: Positive for back pain. Negative for gait problem. Skin: Negative. Prior to Visit Medications    Medication Sig Taking? Authorizing Provider   omeprazole (PRILOSEC) 40 MG delayed release capsule Take 1 capsule by mouth every morning (before breakfast) Yes STARLA Monroe CNP   famotidine (PEPCID) 20 MG tablet Take 1 tablet by mouth nightly Yes STARLA Monroe CNP   calcium carbonate (TUMS) 500 MG chewable tablet Take 1 tablet by mouth daily as needed. Historical Provider, MD        Social History     Tobacco Use    Smoking status: Never Smoker    Smokeless tobacco: Never Used   Substance Use Topics    Alcohol use: No     Comment: quit drinking 2001        Vitals:    21 1019   BP: 106/74   Pulse: 77   Resp: 16   Temp: 96.9 °F (36.1 °C)   SpO2: 98%   Weight: 173 lb 3.2 oz (78.6 kg)   Height: 5' 11\" (1.803 m)     Estimated body mass index is 24.16 kg/m² as calculated from the following:    Height as of this encounter: 5' 11\" (1.803 m). Weight as of this encounter: 173 lb 3.2 oz (78.6 kg). Physical Exam  Vitals signs reviewed. Constitutional:       Appearance: Normal appearance. He is normal weight. HENT:      Head: Normocephalic.    Cardiovascular:      Rate and Rhythm: Normal rate and regular rhythm. Pulmonary:      Effort: Pulmonary effort is normal.      Breath sounds: Normal breath sounds. Abdominal:      General: Abdomen is flat. Bowel sounds are normal. There is distension. Palpations: Abdomen is soft. Tenderness: There is abdominal tenderness in the right lower quadrant. There is no right CVA tenderness or left CVA tenderness. Musculoskeletal:      Lumbar back: He exhibits pain and spasm. He exhibits normal range of motion, no tenderness, no bony tenderness, no swelling, no edema, no deformity and no laceration. Skin:     General: Skin is warm and dry. Neurological:      Mental Status: He is alert and oriented to person, place, and time. ASSESSMENT/PLAN:  1. Acute right-sided low back pain with right-sided sciatica  See HPI. Urine dip did not show hematuria. Pain is likely musculoskeletal.  He was also experiencing RLQ pain that was worse upon palpation. Will do STAT abd/pelvis CT to r/o appendicitis. If negative, I would recommend treating the pt with oral prednisone and a muscle relaxer for sciatica. - POCT Urinalysis no Micro    2. Right lower quadrant pain  See #1.     - POCT Urinalysis no Micro  - CT ABDOMEN PELVIS W IV CONTRAST Additional Contrast? Radiologist Recommendation  - CREATININE, SERUM; Future      Return if symptoms worsen or fail to improve. An electronic signature was used to authenticate this note.     --STARLA Amaya - CNP on 3/16/2021 at 10:47 AM

## 2021-03-17 ENCOUNTER — TELEPHONE (OUTPATIENT)
Dept: FAMILY MEDICINE CLINIC | Age: 71
End: 2021-03-17

## 2021-03-17 NOTE — TELEPHONE ENCOUNTER
I called and spoke with the pt. I informed him that I was not able to get in touch with Dr. Genia Brady. He stated that he has an appt with him on 4/12/21, but would call tomorrow to have it moved up. Pt is aware of his CT results and recommendations.

## 2021-03-17 NOTE — TELEPHONE ENCOUNTER
I have personally called Dr. Hector Koroma office at [facility] twice yesterday, and once today to discuss abd/pelvis CT results. Ultimately the pt needs to f/u with Dr. Zahira Edouard regarding results. I called the pt at the number provided. No answer. Left VM to call our office back.

## 2021-04-05 DIAGNOSIS — K21.00 GASTROESOPHAGEAL REFLUX DISEASE WITH ESOPHAGITIS WITHOUT HEMORRHAGE: ICD-10-CM

## 2021-04-05 DIAGNOSIS — R19.5 GUAIAC POSITIVE STOOLS: Primary | ICD-10-CM

## 2021-04-05 RX ORDER — FAMOTIDINE 20 MG/1
20 TABLET, FILM COATED ORAL NIGHTLY
Qty: 90 TABLET | Refills: 3 | OUTPATIENT
Start: 2021-04-05

## 2021-04-05 NOTE — TELEPHONE ENCOUNTER
Refill Request     Last Seen: 1/18/2021    Last Written: 1/18/2021    Next Appointment:   Future Appointments   Date Time Provider [place]   5/27/2021  8:15 AM STARLA Yepez CNP [facility]       Future appointment scheduled      Requested Prescriptions     Pending Prescriptions Disp Refills    famotidine (PEPCID) 20 MG tablet 90 tablet 3     Sig: Take 1 tablet by mouth nightly

## 2021-05-27 ENCOUNTER — OFFICE VISIT (OUTPATIENT)
Dept: FAMILY MEDICINE CLINIC | Age: 71
End: 2021-05-27
Payer: MEDICARE

## 2021-05-27 VITALS
WEIGHT: 174.8 LBS | DIASTOLIC BLOOD PRESSURE: 70 MMHG | BODY MASS INDEX: 25.03 KG/M2 | RESPIRATION RATE: 16 BRPM | OXYGEN SATURATION: 98 % | HEIGHT: 70 IN | SYSTOLIC BLOOD PRESSURE: 110 MMHG | HEART RATE: 85 BPM

## 2021-05-27 DIAGNOSIS — Z12.5 PROSTATE CANCER SCREENING: ICD-10-CM

## 2021-05-27 DIAGNOSIS — K21.00 GASTROESOPHAGEAL REFLUX DISEASE WITH ESOPHAGITIS WITHOUT HEMORRHAGE: ICD-10-CM

## 2021-05-27 DIAGNOSIS — Z12.11 COLON CANCER SCREENING: ICD-10-CM

## 2021-05-27 DIAGNOSIS — Z00.00 ROUTINE GENERAL MEDICAL EXAMINATION AT A HEALTH CARE FACILITY: Primary | ICD-10-CM

## 2021-05-27 PROCEDURE — G0438 PPPS, INITIAL VISIT: HCPCS | Performed by: NURSE PRACTITIONER

## 2021-05-27 RX ORDER — FAMOTIDINE 20 MG/1
20 TABLET, FILM COATED ORAL 2 TIMES DAILY
Qty: 180 TABLET | Refills: 3 | Status: SHIPPED | OUTPATIENT
Start: 2021-05-27 | End: 2022-06-01 | Stop reason: SDUPTHER

## 2021-05-27 ASSESSMENT — PATIENT HEALTH QUESTIONNAIRE - PHQ9
2. FEELING DOWN, DEPRESSED OR HOPELESS: 0
1. LITTLE INTEREST OR PLEASURE IN DOING THINGS: 0
SUM OF ALL RESPONSES TO PHQ QUESTIONS 1-9: 0

## 2021-05-27 NOTE — PATIENT INSTRUCTIONS
Personalized Preventive Plan for Que Mathew - 5/27/2021  Medicare offers a range of preventive health benefits. Some of the tests and screenings are paid in full while other may be subject to a deductible, co-insurance, and/or copay. Some of these benefits include a comprehensive review of your medical history including lifestyle, illnesses that may run in your family, and various assessments and screenings as appropriate. After reviewing your medical record and screening and assessments performed today your provider may have ordered immunizations, labs, imaging, and/or referrals for you. A list of these orders (if applicable) as well as your Preventive Care list are included within your After Visit Summary for your review. Other Preventive Recommendations:    · A preventive eye exam performed by an eye specialist is recommended every 1-2 years to screen for glaucoma; cataracts, macular degeneration, and other eye disorders. · A preventive dental visit is recommended every 6 months. · Try to get at least 150 minutes of exercise per week or 10,000 steps per day on a pedometer . · Order or download the FREE \"Exercise & Physical Activity: Your Everyday Guide\" from [employer]. Call 6-899.769.5506 or search [employer] online. · You need 0976-1000 mg of calcium and 5443-7362 IU of vitamin D per day. It is possible to meet your calcium requirement with diet alone, but a vitamin D supplement is usually necessary to meet this goal.  · When exposed to the sun, use a sunscreen that protects against both UVA and UVB radiation with an SPF of 30 or greater. Reapply every 2 to 3 hours or after sweating, drying off with a towel, or swimming. · Always wear a seat belt when traveling in a car. Always wear a helmet when riding a bicycle or motorcycle.

## 2021-05-27 NOTE — PROGRESS NOTES
Medicare Annual Wellness Visit  Name: Shama Krishnamurthy Date: 2021   MRN: <[id number]> Sex: Male   Age: 70 y.o. Ethnicity: Non-/Non    : 1950 Race: Aaron Doan is here for Medicare AWV    Screenings for behavioral, psychosocial and functional/safety risks, and cognitive dysfunction are all negative except as indicated below. These results, as well as other patient data from the [address] form, are documented in Flowsheets linked to this Encounter. Allergies   Allergen Reactions    Naprosyn [Naproxen] Other (See Comments)     Bad/horrible indigestion    Neosporin [Neomycin-Polymyx-Gramicid] Other (See Comments)     Delayed/prevented healing per patient. Prior to Visit Medications    Medication Sig Taking? Authorizing Provider   famotidine (PEPCID) 20 MG tablet Take 1 tablet by mouth 2 times daily Yes STARLA Lockhart CNP   calcium carbonate (TUMS) 500 MG chewable tablet Take 1 tablet by mouth daily as needed.    Yes Historical Provider, MD         Past Medical History:   Diagnosis Date    Ankle fracture     right    CLL (chronic lymphocytic leukemia) ([facility] [address]     Dr. Michelle Melendez    Dysphagia     GERD (gastroesophageal reflux disease)        Past Surgical History:   Procedure Laterality Date    KNEE ARTHROSCOPY Right 2018    meniscus repair    UPPER GASTROINTESTINAL ENDOSCOPY      UPPER GASTROINTESTINAL ENDOSCOPY  2011    Dr. Oretha Ormond         Family History   Problem Relation Age of Onset    Diabetes Mother     Alzheimer's Disease Mother     High Blood Pressure Mother     Stroke Mother     Diabetes Sister     Stroke Father [de-identified]    Diabetes Paternal Uncle     Asthma Sister     Stroke Paternal Aunt 78    Stroke Paternal Grandmother 80    Cancer Maternal Uncle         lung CA    Cancer Maternal Grandfather         lung CA was smoker too       CareTeam (Including outside providers/suppliers regularly involved in Interventions:  · No Living Will: Advance Care Planning addressed with patient today   · Blank copy provided. Encouraged to complete and provide for entry to health records.      Health Habits/Nutrition:  Health Habits/Nutrition  Do you exercise for at least 20 minutes 2-3 times per week?: (!) No  Have you lost any weight without trying in the past 3 months?: No  Do you eat only one meal per day?: No  Have you seen the dentist within the past year?: Yes  Body mass index: (!) 25.08  Health Habits/Nutrition Interventions:  · Dental exam overdue:  patient encouraged to make appointment with his/her dentist    Hearing/Vision:  No exam data present  Hearing/Vision  Do you or your family notice any trouble with your hearing that hasn't been managed with hearing aids?: No  Do you have difficulty driving, watching TV, or doing any of your daily activities because of your eyesight?: No  Have you had an eye exam within the past year?: (!) No  Hearing/Vision Interventions:  · Vision concerns:  patient encouraged to make appointment with his/her eye specialist    Safety:  Safety  Do you have working smoke detectors?: Yes  Have all throw rugs been removed or fastened?: (!) No  Do you have non-slip mats or surfaces in all bathtubs/showers?: (!) No  Do all of your stairways have a railing or banister?: (!) No  Are your doorways, halls and stairs free of clutter?: Yes  Do you always fasten your seatbelt when you are in a car?: Yes  Safety Interventions:  · Home safety tips provided     Personalized Preventive Plan   Current Health Maintenance Status  Immunization History   Administered Date(s) Administered    Pneumococcal Conjugate 13-valent ([id number]) 10/20/2016    Pneumococcal Polysaccharide ([id number]) 10/24/2017    Td (Adult), 5 Lf Tetanus Toxoid, Pf (Tenivac, Decavac) 08/11/2018    Td, unspecified formulation 07/03/1997    Tdap (Boostrix, Adacel) 08/01/2013        Health Maintenance   Topic Date Due    COVID-19 Vaccine (1) Never done    Shingles Vaccine (1 of 2) Never done   [employer] Visit (AWV)  Never done    Colon Cancer Screen FIT/FOBT  11/04/2020    Flu vaccine (Season Ended) 09/01/2021    Lipid screen  10/28/2024    DTaP/Tdap/Td vaccine (3 - Td) 08/11/2028    Pneumococcal 65+ yrs at Risk Vaccine  Completed    Hepatitis C screen  Completed    Hepatitis A vaccine  Aged Out    Hepatitis B vaccine  Aged Out    Hib vaccine  Aged Out    Meningococcal (ACWY) vaccine  Aged Out     Recommendations for Intercasting Due: see orders and patient instructions/AVS.  . Recommended screening schedule for the next 5-10 years is provided to the patient in written form: see Patient Instructions/AVS.    Zia Kathryn was seen today for medicare awv. Diagnoses and all orders for this visit:    Routine general medical examination at a health care facility    Colon cancer screening  -     POCT Fecal Immunochemical Test (FIT); Future    Gastroesophageal reflux disease with esophagitis without hemorrhage  -     famotidine (PEPCID) 20 MG tablet; Take 1 tablet by mouth 2 times daily    Prostate cancer screening  -     PSA screening           Encouraged to continue with walking, activity.

## 2021-05-28 LAB — PROSTATE SPECIFIC ANTIGEN: 0.54 NG/ML (ref 0–4)

## 2021-08-02 ENCOUNTER — NURSE ONLY (OUTPATIENT)
Dept: FAMILY MEDICINE CLINIC | Age: 71
End: 2021-08-02

## 2021-08-02 DIAGNOSIS — Z12.11 COLON CANCER SCREENING: ICD-10-CM

## 2021-08-02 LAB
CONTROL: ABNORMAL
HEMOCCULT STL QL: POSITIVE

## 2021-08-02 PROCEDURE — 82274 ASSAY TEST FOR BLOOD FECAL: CPT | Performed by: NURSE PRACTITIONER

## 2022-06-01 ENCOUNTER — OFFICE VISIT (OUTPATIENT)
Dept: FAMILY MEDICINE CLINIC | Age: 72
End: 2022-06-01
Payer: MEDICARE

## 2022-06-01 VITALS
HEIGHT: 71 IN | HEART RATE: 74 BPM | SYSTOLIC BLOOD PRESSURE: 110 MMHG | DIASTOLIC BLOOD PRESSURE: 68 MMHG | BODY MASS INDEX: 24.64 KG/M2 | WEIGHT: 176 LBS | RESPIRATION RATE: 18 BRPM | OXYGEN SATURATION: 98 %

## 2022-06-01 DIAGNOSIS — B86 SCABIES: ICD-10-CM

## 2022-06-01 DIAGNOSIS — Z12.5 PROSTATE CANCER SCREENING: ICD-10-CM

## 2022-06-01 DIAGNOSIS — K21.00 GASTROESOPHAGEAL REFLUX DISEASE WITH ESOPHAGITIS WITHOUT HEMORRHAGE: ICD-10-CM

## 2022-06-01 DIAGNOSIS — R19.5 POSITIVE FIT (FECAL IMMUNOCHEMICAL TEST): ICD-10-CM

## 2022-06-01 DIAGNOSIS — C91.10 CLL (CHRONIC LYMPHOCYTIC LEUKEMIA) (HCC): ICD-10-CM

## 2022-06-01 DIAGNOSIS — Z00.00 MEDICARE ANNUAL WELLNESS VISIT, SUBSEQUENT: Primary | ICD-10-CM

## 2022-06-01 LAB
A/G RATIO: 2.6 (ref 1.1–2.2)
ALBUMIN SERPL-MCNC: 4.6 G/DL (ref 3.4–5)
ALP BLD-CCNC: 56 U/L (ref 40–129)
ALT SERPL-CCNC: 14 U/L (ref 10–40)
ANION GAP SERPL CALCULATED.3IONS-SCNC: 14 MMOL/L (ref 3–16)
AST SERPL-CCNC: 19 U/L (ref 15–37)
BILIRUB SERPL-MCNC: 0.6 MG/DL (ref 0–1)
BUN BLDV-MCNC: 14 MG/DL (ref 7–20)
CALCIUM SERPL-MCNC: 8.8 MG/DL (ref 8.3–10.6)
CHLORIDE BLD-SCNC: 105 MMOL/L (ref 99–110)
CO2: 22 MMOL/L (ref 21–32)
CONTROL: NORMAL
CREAT SERPL-MCNC: 1 MG/DL (ref 0.8–1.3)
GFR AFRICAN AMERICAN: >60
GFR NON-AFRICAN AMERICAN: >60
GLUCOSE BLD-MCNC: 96 MG/DL (ref 70–99)
HEMOCCULT STL QL: NEGATIVE
POTASSIUM REFLEX MAGNESIUM: 4.6 MMOL/L (ref 3.5–5.1)
PROSTATE SPECIFIC ANTIGEN: 0.54 NG/ML (ref 0–4)
SODIUM BLD-SCNC: 141 MMOL/L (ref 136–145)
TOTAL PROTEIN: 6.4 G/DL (ref 6.4–8.2)

## 2022-06-01 PROCEDURE — G0439 PPPS, SUBSEQ VISIT: HCPCS | Performed by: NURSE PRACTITIONER

## 2022-06-01 PROCEDURE — 1123F ACP DISCUSS/DSCN MKR DOCD: CPT | Performed by: NURSE PRACTITIONER

## 2022-06-01 PROCEDURE — 82274 ASSAY TEST FOR BLOOD FECAL: CPT | Performed by: NURSE PRACTITIONER

## 2022-06-01 RX ORDER — PERMETHRIN 50 MG/G
CREAM TOPICAL
Qty: 60 G | Refills: 0 | Status: SHIPPED | OUTPATIENT
Start: 2022-06-01 | End: 2022-09-12

## 2022-06-01 RX ORDER — FAMOTIDINE 20 MG/1
20 TABLET, FILM COATED ORAL 2 TIMES DAILY
Qty: 180 TABLET | Refills: 3 | Status: SHIPPED | OUTPATIENT
Start: 2022-06-01

## 2022-06-01 SDOH — ECONOMIC STABILITY: INCOME INSECURITY: IN THE LAST 12 MONTHS, WAS THERE A TIME WHEN YOU WERE NOT ABLE TO PAY THE MORTGAGE OR RENT ON TIME?: NO

## 2022-06-01 SDOH — ECONOMIC STABILITY: HOUSING INSECURITY: IN THE LAST 12 MONTHS, HOW MANY PLACES HAVE YOU LIVED?: 1

## 2022-06-01 SDOH — ECONOMIC STABILITY: TRANSPORTATION INSECURITY
IN THE PAST 12 MONTHS, HAS THE LACK OF TRANSPORTATION KEPT YOU FROM MEDICAL APPOINTMENTS OR FROM GETTING MEDICATIONS?: NO

## 2022-06-01 SDOH — ECONOMIC STABILITY: TRANSPORTATION INSECURITY
IN THE PAST 12 MONTHS, HAS LACK OF TRANSPORTATION KEPT YOU FROM MEETINGS, WORK, OR FROM GETTING THINGS NEEDED FOR DAILY LIVING?: NO

## 2022-06-01 SDOH — ECONOMIC STABILITY: HOUSING INSECURITY
IN THE LAST 12 MONTHS, WAS THERE A TIME WHEN YOU DID NOT HAVE A STEADY PLACE TO SLEEP OR SLEPT IN A SHELTER (INCLUDING NOW)?: NO

## 2022-06-01 SDOH — ECONOMIC STABILITY: FOOD INSECURITY: WITHIN THE PAST 12 MONTHS, YOU WORRIED THAT YOUR FOOD WOULD RUN OUT BEFORE YOU GOT MONEY TO BUY MORE.: NEVER TRUE

## 2022-06-01 SDOH — ECONOMIC STABILITY: FOOD INSECURITY: WITHIN THE PAST 12 MONTHS, THE FOOD YOU BOUGHT JUST DIDN'T LAST AND YOU DIDN'T HAVE MONEY TO GET MORE.: NEVER TRUE

## 2022-06-01 ASSESSMENT — PATIENT HEALTH QUESTIONNAIRE - PHQ9
SUM OF ALL RESPONSES TO PHQ QUESTIONS 1-9: 0
SUM OF ALL RESPONSES TO PHQ9 QUESTIONS 1 & 2: 0
SUM OF ALL RESPONSES TO PHQ QUESTIONS 1-9: 0
2. FEELING DOWN, DEPRESSED OR HOPELESS: 0
1. LITTLE INTEREST OR PLEASURE IN DOING THINGS: 0

## 2022-06-01 ASSESSMENT — SOCIAL DETERMINANTS OF HEALTH (SDOH): HOW HARD IS IT FOR YOU TO PAY FOR THE VERY BASICS LIKE FOOD, HOUSING, MEDICAL CARE, AND HEATING?: NOT HARD AT ALL

## 2022-06-01 ASSESSMENT — LIFESTYLE VARIABLES: HOW OFTEN DO YOU HAVE A DRINK CONTAINING ALCOHOL: NEVER

## 2022-06-01 NOTE — PROGRESS NOTES
Medicare Annual Wellness Visit    Kevin Weber is here for Medicare AWV    Assessment & Plan   Medicare annual wellness visit, subsequent  -     Comprehensive Metabolic Panel w/ Reflex to MG  -     PSA Screening  CLL (chronic lymphocytic leukemia) (HCC)  Gastroesophageal reflux disease with esophagitis without hemorrhage  -     famotidine (PEPCID) 20 MG tablet; Take 1 tablet by mouth 2 times daily, Disp-180 tablet, R-3Normal  -     Comprehensive Metabolic Panel w/ Reflex to MG  Scabies  -     permethrin (ELIMITE) 5 % cream; Apply topically as directed, Disp-60 g, R-0, Normal  Prostate cancer screening  -     PSA Screening  Positive FIT (fecal immunochemical test)  -     POCT Fecal Immunochemical Test (FIT); Future       Agreeable to repeating FIT test. Will consider GI if positive. Recommendations for Preventive Services Due: see orders and patient instructions/AVS.  Recommended screening schedule for the next 5-10 years is provided to the patient in written form: see Patient Instructions/AVS.     Return for Medicare Annual Wellness Visit in 1 year. Subjective   The following acute and/or chronic problems were also addressed today:    He and his wife have had a rash for over 6 months. Wife has VV 1/22 and was px loprox. Started as little bumps and itching. Has used 2-3 OTC anti fungal medications without relief. Feels is involving all of body except face. Eric Atkinson has symptoms as well. He was tx for scabies but did not resolve. Has appt with derm, Dr. Horace Dias, In 2 weeks for routine visit. Had positive FIT test and was referred to GI. However he feels he was [place] the run around\" with who he could see and didn't follow up. Feels positive was related to hemorrhoids. Denies dark stool or BRRB. Following with oncology for CLL. Seen last week, following every 6 months. No tx indicated at this time.      Patient's complete Health Risk Assessment and screening values have been reviewed and are found in [facility]. The following problems were reviewed today and where indicated follow up appointments were made and/or referrals ordered.     Positive Risk Factor Screenings with Interventions:    Fall Risk:  Do you feel unsteady or are you worried about falling? : no  2 or more falls in past year?: no  Fall with injury in past year?: (!) yes     Fall Risk Interventions:    · Home safety tips provided              General Health and ACP:  General  In general, how would you say your health is?: Very Good  In the past 7 days, have you experienced any of the following: New or Increased Pain, New or Increased Fatigue, Loneliness, Social Isolation, Stress or Anger?: (!) Yes  Select all that apply: (!) Anger  Do you get the social and emotional support that you need?: Yes  Do you have a Living Will?: (!) No    Advance Directives     Power of  Living Will ACP-Advance Directive ACP-Power of     Not on File Filed on 09/01/21 Filed Not on File      General Health Risk Interventions:  · No Living Will: reviewed ADL today    Health Habits/Nutrition:     Physical Activity: Inactive    Days of Exercise per Week: 0 days    Minutes of Exercise per Session: 0 min     Have you lost any weight without trying in the past 3 months?: No  Body mass index: 24.54  Have you seen the dentist within the past year?: Yes    Health Habits/Nutrition Interventions:  · Dental exam overdue:  yearly dental exam recommended     Safety:  Do you have working smoke detectors?: Yes  Do you have any tripping hazards - loose or unsecured carpets or rugs?: (!) Yes  Do you have any tripping hazards - clutter in doorways, halls, or stairs?: No  Do you have either shower bars, grab bars, non-slip mats or non-slip surfaces in your shower or bathtub?: Yes  Do all of your stairways have a railing or banister?: Not Applicable  Do you always fasten your seatbelt when you are in a car?: Yes    Safety Interventions:  · Home safety tips provided Objective   Vitals:    06/01/22 0834   BP: 110/68   Site: Right Upper Arm   Position: Sitting   Cuff Size: Medium Adult   Pulse: 74   Resp: 18   SpO2: 98%   Weight: 176 lb (79.8 kg)   Height: 5' 11\" (1.803 m)      Body mass index is 24.55 kg/m². Pulmonary/Chest: clear to auscultation bilaterally- no wheezes, rales or rhonchi, normal air movement, no respiratory distress  Cardiovascular: normal rate, normal S1 and S2, no gallops, intact distal pulses and no carotid bruits    Trunk, hands, extremities with scattered red slightly raised area, linear on fingers. Allergies   Allergen Reactions    Naprosyn [Naproxen] Other (See Comments)     Bad/horrible indigestion    Neosporin [Neomycin-Polymyx-Gramicid] Other (See Comments)     Delayed/prevented healing per patient. Prior to Visit Medications    Medication Sig Taking? Authorizing Provider   permethrin (ELIMITE) 5 % cream Apply topically as directed Yes STARLA Jean CNP   famotidine (PEPCID) 20 MG tablet Take 1 tablet by mouth 2 times daily Yes STARLA Jean CNP   calcium carbonate (TUMS) 500 MG chewable tablet Take 1 tablet by mouth daily as needed.    Yes Historical Provider, MD Miguel (Including outside providers/suppliers regularly involved in providing care):   Patient Care Team:  STARLA Jean CNP as PCP - General (Family Nurse Practitioner)  STARLA Jean CNP as PCP - [facility] Empaneled Provider  Louise Kraft MD as Consulting Physician (Gastroenterology)     Reviewed and updated this visit:  Tobacco  Allergies  Meds  Problems  Med Hx  Surg Hx  Soc Hx  Fam Hx

## 2022-06-01 NOTE — ACP (ADVANCE CARE PLANNING)
Advance Care Planning     Advance Care Planning (ACP) Physician/NP/PA Conversation    Date of Conversation: 6/1/2022  Conducted with: Patient with Decision Making Capacity    Healthcare Decision Maker:      Primary Decision Maker: Darrell Chambers - Spouse - [phone]    Primary Decision Maker: Kinga Mcgee - Brother/Sister - [phone]    Click here to complete Healthcare Decision Makers including selection of the Healthcare Decision Maker Relationship (ie \"Primary\")  Today we documented Decision Maker(s) consistent with Legal Next of Kin hierarchy. Care Preferences:    Hospitalization: \"If your health worsens and it becomes clear that your chance of recovery is unlikely, what would be your preference regarding hospitalization? \"  The patient would prefer hospitalization. Ventilation: \"If you were unable to breath on your own and your chance of recovery was unlikely, what would be your preference about the use of a ventilator (breathing machine) if it was available to you? \"  The patient would desire the use of a ventilator. Resuscitation: \"In the event your heart stopped as a result of an underlying serious health condition, would you want attempts made to restart your heart, or would you prefer a natural death? \"  Yes, attempt to resuscitate.     treatment goals    Conversation Outcomes / Follow-Up Plan:  ACP complete - no further action today  Reviewed DNR/DNI and patient elects Full Code (Attempt Resuscitation)    Length of Voluntary ACP Conversation in minutes:  <16 minutes (Non-Billable)    AMERICA HARDEN, APRN - CNP

## 2022-09-12 ENCOUNTER — OFFICE VISIT (OUTPATIENT)
Dept: FAMILY MEDICINE CLINIC | Age: 72
End: 2022-09-12
Payer: MEDICARE

## 2022-09-12 VITALS
HEART RATE: 63 BPM | OXYGEN SATURATION: 97 % | SYSTOLIC BLOOD PRESSURE: 90 MMHG | RESPIRATION RATE: 18 BRPM | BODY MASS INDEX: 24.27 KG/M2 | WEIGHT: 174 LBS | DIASTOLIC BLOOD PRESSURE: 60 MMHG

## 2022-09-12 DIAGNOSIS — C91.10 CLL (CHRONIC LYMPHOCYTIC LEUKEMIA) (HCC): Primary | ICD-10-CM

## 2022-09-12 PROCEDURE — 99213 OFFICE O/P EST LOW 20 MIN: CPT | Performed by: NURSE PRACTITIONER

## 2022-09-12 PROCEDURE — 1123F ACP DISCUSS/DSCN MKR DOCD: CPT | Performed by: NURSE PRACTITIONER

## 2022-09-12 NOTE — PROGRESS NOTES
Zulay Goldsmith (:  1950) is a 67 y.o. male,Established patient, here for evaluation of the following chief complaint(s): Mass (Right jaw)         ASSESSMENT/PLAN:  1. CLL (chronic lymphocytic leukemia) (HCC)      Feels lump in swelling of a singular lymph gland. If fails to improve, worsens, fevers, chills, nightsweats  or reoccurs will order CT scan for follow up. Agreeable     Following with [facility] for CLL. Last seen in April. No follow-ups on file. Subjective   SUBJECTIVE/OBJECTIVE:  HPI    Noted a large knot under right jaw but has now gone done. No break in the skin. Denies drainage. Mildly tender. Denies fevers chills. Wife noted prior to him noticing. 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 Review of Systems   All other systems reviewed and are negative. Objective   Physical Exam  Constitutional:       Appearance: Normal appearance. Neck:      Comments: Small lump size of thumb nail noted right upper TMJ border. Neg for redness, warmth. Unable to appreciate additional masses. Cardiovascular:      Rate and Rhythm: Normal rate. Pulmonary:      Effort: Pulmonary effort is normal.   Musculoskeletal:         General: Normal range of motion. Neurological:      Mental Status: He is alert.                 An electronic signature was used to authenticate this note.    --STARLA WEBER - CNP

## 2022-11-30 PROBLEM — S60.459A: Status: RESOLVED | Noted: 2020-06-05 | Resolved: 2022-11-30

## 2022-11-30 PROBLEM — L08.9: Status: RESOLVED | Noted: 2020-06-05 | Resolved: 2022-11-30

## 2022-12-10 ENCOUNTER — TELEPHONE (OUTPATIENT)
Dept: FAMILY MEDICINE CLINIC | Age: 72
End: 2022-12-10

## 2022-12-10 NOTE — TELEPHONE ENCOUNTER
On call communication: patient called in complaining of fever, fatigue, cough and congestion. Started Thursday, worsening. Denies soa. Has been taking allergy medication and aspirin   Grandsons (live with him) were sick last week. No one has been tested for flu or covid. Discussed options including symptomatic care- offered to send in [place] [phone] to pharmacy and place flu and covid orders which patient could have done on Monday(would be outside of window for tamiflu at that point). Pt elected to try little clinic/urgent care today for testing.

## 2023-06-01 DIAGNOSIS — K21.00 GASTROESOPHAGEAL REFLUX DISEASE WITH ESOPHAGITIS WITHOUT HEMORRHAGE: ICD-10-CM

## 2023-06-01 RX ORDER — FAMOTIDINE 20 MG/1
TABLET, FILM COATED ORAL
Qty: 180 TABLET | Refills: 3 | Status: SHIPPED | OUTPATIENT
Start: 2023-06-01

## 2023-06-01 NOTE — TELEPHONE ENCOUNTER
Refill Request     CONFIRM preferred pharmacy with the patient. If Mail Order Rx - Pend for 90 day refill. Last Seen: Last Seen Department: 9/12/2022  Last Seen by PCP: 9/12/2022    Last Written: 6/01/2022, #180, 3 refills    If no future appointment scheduled:  Review the last OV with PCP and review information for follow-up visit,  Route STAFF MESSAGE with patient name to the [facility] for scheduling with the following information:            -  Timing of next visit           -  Visit type ie Physical, OV, etc           -  Diagnoses/Reason ie. COPD, HTN - Do not use MEDICATION, Follow-up or CHECK UP - Give reason for visit      Next Appointment:   Future Appointments   Date Time Provider [facility] [address]   6/2/2023  8:00 AM STARLA Agosto - ELIZABETH ELLISON       Message sent to [address] [place] [address] to schedule appt with patient?   N/A      Requested Prescriptions     Pending Prescriptions Disp Refills    famotidine (PEPCID) 20 MG tablet [Pharmacy Med Name: FAMOTIDINE 20 MG TABLET] 180 tablet 3     Sig: TAKE ONE TABLET BY MOUTH TWICE A DAY

## 2023-06-02 ENCOUNTER — OFFICE VISIT (OUTPATIENT)
Dept: FAMILY MEDICINE CLINIC | Age: 73
End: 2023-06-02

## 2023-06-02 VITALS
HEART RATE: 69 BPM | HEIGHT: 71 IN | WEIGHT: 175 LBS | DIASTOLIC BLOOD PRESSURE: 80 MMHG | BODY MASS INDEX: 24.5 KG/M2 | OXYGEN SATURATION: 99 % | SYSTOLIC BLOOD PRESSURE: 110 MMHG | RESPIRATION RATE: 18 BRPM

## 2023-06-02 DIAGNOSIS — K21.00 GASTROESOPHAGEAL REFLUX DISEASE WITH ESOPHAGITIS WITHOUT HEMORRHAGE: ICD-10-CM

## 2023-06-02 DIAGNOSIS — Z12.11 COLON CANCER SCREENING: ICD-10-CM

## 2023-06-02 DIAGNOSIS — Z12.5 PROSTATE CANCER SCREENING: ICD-10-CM

## 2023-06-02 DIAGNOSIS — Z00.00 MEDICARE ANNUAL WELLNESS VISIT, SUBSEQUENT: Primary | ICD-10-CM

## 2023-06-02 DIAGNOSIS — C91.10 CLL (CHRONIC LYMPHOCYTIC LEUKEMIA) (HCC): ICD-10-CM

## 2023-06-02 LAB
ALBUMIN SERPL-MCNC: 4.5 G/DL (ref 3.4–5)
ALBUMIN/GLOB SERPL: 2.6 {RATIO} (ref 1.1–2.2)
ALP SERPL-CCNC: 57 U/L (ref 40–129)
ALT SERPL-CCNC: 16 U/L (ref 10–40)
ANION GAP SERPL CALCULATED.3IONS-SCNC: 13 MMOL/L (ref 3–16)
AST SERPL-CCNC: 26 U/L (ref 15–37)
BILIRUB SERPL-MCNC: 0.8 MG/DL (ref 0–1)
BUN SERPL-MCNC: 14 MG/DL (ref 7–20)
CALCIUM SERPL-MCNC: 9 MG/DL (ref 8.3–10.6)
CHLORIDE SERPL-SCNC: 107 MMOL/L (ref 99–110)
CO2 SERPL-SCNC: 26 MMOL/L (ref 21–32)
CREAT SERPL-MCNC: 0.9 MG/DL (ref 0.8–1.3)
GFR SERPLBLD CREATININE-BSD FMLA CKD-EPI: >60 ML/MIN/{1.73_M2}
GLUCOSE SERPL-MCNC: 72 MG/DL (ref 70–99)
POTASSIUM SERPL-SCNC: 4.2 MMOL/L (ref 3.5–5.1)
PROT SERPL-MCNC: 6.2 G/DL (ref 6.4–8.2)
PSA SERPL DL<=0.01 NG/ML-MCNC: 1 NG/ML (ref 0–4)
SODIUM SERPL-SCNC: 146 MMOL/L (ref 136–145)

## 2023-06-02 SDOH — ECONOMIC STABILITY: FOOD INSECURITY: WITHIN THE PAST 12 MONTHS, YOU WORRIED THAT YOUR FOOD WOULD RUN OUT BEFORE YOU GOT MONEY TO BUY MORE.: NEVER TRUE

## 2023-06-02 SDOH — ECONOMIC STABILITY: FOOD INSECURITY: WITHIN THE PAST 12 MONTHS, THE FOOD YOU BOUGHT JUST DIDN'T LAST AND YOU DIDN'T HAVE MONEY TO GET MORE.: NEVER TRUE

## 2023-06-02 SDOH — ECONOMIC STABILITY: INCOME INSECURITY: HOW HARD IS IT FOR YOU TO PAY FOR THE VERY BASICS LIKE FOOD, HOUSING, MEDICAL CARE, AND HEATING?: NOT HARD AT ALL

## 2023-06-02 ASSESSMENT — PATIENT HEALTH QUESTIONNAIRE - PHQ9
2. FEELING DOWN, DEPRESSED OR HOPELESS: 0
SUM OF ALL RESPONSES TO PHQ QUESTIONS 1-9: 0
SUM OF ALL RESPONSES TO PHQ9 QUESTIONS 1 & 2: 0
SUM OF ALL RESPONSES TO PHQ QUESTIONS 1-9: 0
1. LITTLE INTEREST OR PLEASURE IN DOING THINGS: 0
SUM OF ALL RESPONSES TO PHQ QUESTIONS 1-9: 0
SUM OF ALL RESPONSES TO PHQ QUESTIONS 1-9: 0

## 2023-06-02 ASSESSMENT — LIFESTYLE VARIABLES
HOW MANY STANDARD DRINKS CONTAINING ALCOHOL DO YOU HAVE ON A TYPICAL DAY: PATIENT DOES NOT DRINK
HOW OFTEN DO YOU HAVE A DRINK CONTAINING ALCOHOL: NEVER

## 2023-06-02 NOTE — PROGRESS NOTES
Medicare Annual Wellness Visit    Matty Wilson is here for Medicare AWV    Assessment & Plan   Medicare annual wellness visit, subsequent  CLL (chronic lymphocytic leukemia) ([facility] [address]  Prostate cancer screening  -     PSA Screening; Future  Gastroesophageal reflux disease with esophagitis without hemorrhage  -     Comprehensive Metabolic Panel; Future  Colon cancer screening  -     Fecal DNA Colorectal cancer screening ([place])        Recommendations for Preventive Services Due: see orders and patient instructions/AVS.  Recommended screening schedule for the next 5-10 years is provided to the patient in written form: see Patient Instructions/AVS.     Return in about 1 year (around 6/2/2024) for AWV. Subjective       Following with [facility] for CLL, last seen 5/30/2023. Felt to be doing well. Patient's complete Health Risk Assessment and screening values have been reviewed and are found in Flowsheets. The following problems were reviewed today and where indicated follow up appointments were made and/or referrals ordered. Positive Risk Factor Screenings with Interventions:    Fall Risk:  Do you feel unsteady or are you worried about falling? : no  2 or more falls in past year?: no  Fall with injury in past year?: (!) yes     Interventions:    Aware of safety            General HRA Questions:  Select all that apply: (!) Anger    Anger Interventions:  stable                             Objective   Vitals:    06/02/23 0802   BP: 110/80   Site: Right Upper Arm   Position: Sitting   Cuff Size: Medium Adult   Pulse: 69   Resp: 18   SpO2: 99%   Weight: 175 lb (79.4 kg)   Height: 5' 11\" (1.803 m)      Body mass index is 24.41 kg/m². Allergies   Allergen Reactions    Naprosyn [Naproxen] Other (See Comments)     Bad/horrible indigestion    Neosporin [Neomycin-Polymyx-Gramicid] Other (See Comments)     Delayed/prevented healing per patient. Prior to Visit Medications    Medication Sig Taking?

## 2023-07-12 LAB — NONINV COLON CA DNA+OCC BLD SCRN STL QL: NEGATIVE

## 2024-05-31 DIAGNOSIS — K21.00 GASTROESOPHAGEAL REFLUX DISEASE WITH ESOPHAGITIS WITHOUT HEMORRHAGE: ICD-10-CM

## 2024-06-01 NOTE — TELEPHONE ENCOUNTER
Refill Request     CONFIRM preferred pharmacy with the patient.    If Mail Order Rx - Pend for 90 day refill.      Last Seen: Last Seen Department: 6/2/2023  Last Seen by PCP: 6/2/2023    Last Written: 6/1/2023    If no future appointment scheduled:  Review the last OV with PCP and review information for follow-up visit,  Route STAFF MESSAGE with patient name to the  Pool for scheduling with the following information:            -  Timing of next visit           -  Visit type ie Physical, OV, etc           -  Diagnoses/Reason ie. COPD, HTN - Do not use MEDICATION, Follow-up or CHECK UP - Give reason for visit      Next Appointment:   Future Appointments   Date Time Provider Department Center   6/13/2024  8:30 AM Teresa Aceves, STARLA - CNP [facility]  [place] [facility]       Message sent to  to schedule appt with patient?  NO      Requested Prescriptions     Pending Prescriptions Disp Refills    famotidine (PEPCID) 20 MG tablet [Pharmacy Med Name: FAMOTIDINE 20 MG TABLET] 180 tablet 3     Sig: TAKE 1 TABLET BY MOUTH TWICE A DAY

## 2024-06-03 RX ORDER — FAMOTIDINE 20 MG/1
20 TABLET, FILM COATED ORAL 2 TIMES DAILY
Qty: 180 TABLET | Refills: 0 | Status: SHIPPED | OUTPATIENT
Start: 2024-06-03

## 2024-06-13 ENCOUNTER — OFFICE VISIT (OUTPATIENT)
Dept: FAMILY MEDICINE CLINIC | Age: 74
End: 2024-06-13
Payer: MEDICARE

## 2024-06-13 VITALS
DIASTOLIC BLOOD PRESSURE: 60 MMHG | WEIGHT: 178.2 LBS | OXYGEN SATURATION: 99 % | SYSTOLIC BLOOD PRESSURE: 110 MMHG | HEIGHT: 70 IN | HEART RATE: 61 BPM | BODY MASS INDEX: 25.51 KG/M2 | RESPIRATION RATE: 18 BRPM

## 2024-06-13 DIAGNOSIS — Z12.5 PROSTATE CANCER SCREENING: ICD-10-CM

## 2024-06-13 DIAGNOSIS — K21.00 GASTROESOPHAGEAL REFLUX DISEASE WITH ESOPHAGITIS WITHOUT HEMORRHAGE: ICD-10-CM

## 2024-06-13 DIAGNOSIS — C91.10 CLL (CHRONIC LYMPHOCYTIC LEUKEMIA) (HCC): ICD-10-CM

## 2024-06-13 DIAGNOSIS — Z00.00 MEDICARE ANNUAL WELLNESS VISIT, SUBSEQUENT: Primary | ICD-10-CM

## 2024-06-13 LAB
ALBUMIN SERPL-MCNC: 4.4 G/DL (ref 3.4–5)
ALBUMIN/GLOB SERPL: 2.3 {RATIO} (ref 1.1–2.2)
ALP SERPL-CCNC: 64 U/L (ref 40–129)
ALT SERPL-CCNC: 18 U/L (ref 10–40)
ANION GAP SERPL CALCULATED.3IONS-SCNC: 12 MMOL/L (ref 3–16)
AST SERPL-CCNC: 23 U/L (ref 15–37)
BILIRUB SERPL-MCNC: 0.5 MG/DL (ref 0–1)
BUN SERPL-MCNC: 16 MG/DL (ref 7–20)
CALCIUM SERPL-MCNC: 9.1 MG/DL (ref 8.3–10.6)
CHLORIDE SERPL-SCNC: 106 MMOL/L (ref 99–110)
CO2 SERPL-SCNC: 26 MMOL/L (ref 21–32)
CREAT SERPL-MCNC: 0.9 MG/DL (ref 0.8–1.3)
GFR SERPLBLD CREATININE-BSD FMLA CKD-EPI: 90 ML/MIN/{1.73_M2}
GLUCOSE SERPL-MCNC: 83 MG/DL (ref 70–99)
POTASSIUM SERPL-SCNC: 4.4 MMOL/L (ref 3.5–5.1)
PROT SERPL-MCNC: 6.3 G/DL (ref 6.4–8.2)
PSA SERPL DL<=0.01 NG/ML-MCNC: 0.78 NG/ML (ref 0–4)
SODIUM SERPL-SCNC: 144 MMOL/L (ref 136–145)

## 2024-06-13 PROCEDURE — 1123F ACP DISCUSS/DSCN MKR DOCD: CPT | Performed by: NURSE PRACTITIONER

## 2024-06-13 PROCEDURE — G0439 PPPS, SUBSEQ VISIT: HCPCS | Performed by: NURSE PRACTITIONER

## 2024-06-13 RX ORDER — FAMOTIDINE 20 MG/1
20 TABLET, FILM COATED ORAL 2 TIMES DAILY
Qty: 180 TABLET | Refills: 3 | Status: SHIPPED | OUTPATIENT
Start: 2024-06-13

## 2024-06-13 SDOH — ECONOMIC STABILITY: FOOD INSECURITY: WITHIN THE PAST 12 MONTHS, YOU WORRIED THAT YOUR FOOD WOULD RUN OUT BEFORE YOU GOT MONEY TO BUY MORE.: NEVER TRUE

## 2024-06-13 SDOH — ECONOMIC STABILITY: FOOD INSECURITY: WITHIN THE PAST 12 MONTHS, THE FOOD YOU BOUGHT JUST DIDN'T LAST AND YOU DIDN'T HAVE MONEY TO GET MORE.: NEVER TRUE

## 2024-06-13 SDOH — ECONOMIC STABILITY: INCOME INSECURITY: HOW HARD IS IT FOR YOU TO PAY FOR THE VERY BASICS LIKE FOOD, HOUSING, MEDICAL CARE, AND HEATING?: NOT HARD AT ALL

## 2024-06-13 ASSESSMENT — LIFESTYLE VARIABLES
HOW OFTEN DO YOU HAVE A DRINK CONTAINING ALCOHOL: NEVER
HOW MANY STANDARD DRINKS CONTAINING ALCOHOL DO YOU HAVE ON A TYPICAL DAY: PATIENT DOES NOT DRINK

## 2024-06-13 ASSESSMENT — PATIENT HEALTH QUESTIONNAIRE - PHQ9
1. LITTLE INTEREST OR PLEASURE IN DOING THINGS: NOT AT ALL
SUM OF ALL RESPONSES TO PHQ QUESTIONS 1-9: 0
2. FEELING DOWN, DEPRESSED OR HOPELESS: NOT AT ALL
SUM OF ALL RESPONSES TO PHQ9 QUESTIONS 1 & 2: 0

## 2024-06-13 NOTE — PATIENT INSTRUCTIONS
and not smoking or using tobacco. It also includes taking medicines as directed, managing other health conditions, and trying to get a healthy amount of sleep.  Follow-up care is a key part of your treatment and safety. Be sure to make and go to all appointments, and call your doctor if you are having problems. It's also a good idea to know your test results and keep a list of the medicines you take.  How can you care for yourself at home?  Diet    Use less salt when you cook and eat. This helps lower your blood pressure. Taste food before salting. Add only a little salt when you think you need it. With time, your taste buds will adjust to less salt.     Eat fewer snack items, fast foods, canned soups, and other high-salt, high-fat, processed foods.     Read food labels and try to avoid saturated and trans fats. They increase your risk of heart disease by raising cholesterol levels.     Limit the amount of solid fat--butter, margarine, and shortening--you eat. Use olive, peanut, or canola oil when you cook. Bake, broil, and steam foods instead of frying them.     Eat a variety of fruit and vegetables every day. Dark green, deep orange, red, or yellow fruits and vegetables are especially good for you. Examples include spinach, carrots, peaches, and berries.     Foods high in fiber can reduce your cholesterol and provide important vitamins and minerals. High-fiber foods include whole-grain cereals and breads, oatmeal, beans, brown rice, citrus fruits, and apples.     Eat lean proteins. Heart-healthy proteins include seafood, lean meats and poultry, eggs, beans, peas, nuts, seeds, and soy products.     Limit drinks and foods with added sugar. These include candy, desserts, and soda pop.   Heart-healthy lifestyle    If your doctor recommends it, get more exercise. For many people, walking is a good choice. Or you may want to swim, bike, or do other activities. Bit by bit, increase the time you're active every day. Try

## 2024-06-13 NOTE — PROGRESS NOTES
Position: Sitting   Cuff Size: Medium Adult   Pulse: 61   Resp: 18   SpO2: 99%   Weight: 80.8 kg (178 lb 3.2 oz)   Height: 1.778 m (5' 10\")      Body mass index is 25.57 kg/m².             Allergies   Allergen Reactions    Naprosyn [Naproxen] Other (See Comments)     Bad/horrible indigestion    Neosporin [Neomycin-Polymyx-Gramicid] Other (See Comments)     Delayed/prevented healing per patient.     Prior to Visit Medications    Medication Sig Taking? Authorizing Provider   famotidine (PEPCID) 20 MG tablet Take 1 tablet by mouth 2 times daily Yes Teresa Aceves APRN - CNP       CareTeam (Including outside providers/suppliers regularly involved in providing care):   Patient Care Team:  Teresa Aceves APRN - CNP as PCP - General (Family Nurse Practitioner)  Teresa Aceves APRN - CNP as PCP - Empaneled Provider  Rosalio Garcia MD as Consulting Physician (Gastroenterology)     Reviewed and updated this visit:  Tobacco  Allergies  Meds  Problems  Med Hx  Surg Hx  Soc Hx  Fam Hx

## 2025-06-16 ENCOUNTER — OFFICE VISIT (OUTPATIENT)
Dept: FAMILY MEDICINE CLINIC | Age: 75
End: 2025-06-16
Payer: MEDICARE

## 2025-06-16 VITALS
BODY MASS INDEX: 25.57 KG/M2 | HEART RATE: 65 BPM | RESPIRATION RATE: 18 BRPM | HEIGHT: 70 IN | OXYGEN SATURATION: 98 % | SYSTOLIC BLOOD PRESSURE: 118 MMHG | DIASTOLIC BLOOD PRESSURE: 60 MMHG | WEIGHT: 178.6 LBS

## 2025-06-16 DIAGNOSIS — Z12.5 PROSTATE CANCER SCREENING: ICD-10-CM

## 2025-06-16 DIAGNOSIS — K21.00 GASTROESOPHAGEAL REFLUX DISEASE WITH ESOPHAGITIS WITHOUT HEMORRHAGE: ICD-10-CM

## 2025-06-16 DIAGNOSIS — M25.841 CYST OF JOINT OF RIGHT HAND: ICD-10-CM

## 2025-06-16 DIAGNOSIS — E78.01 FAMILIAL HYPERCHOLESTEROLEMIA: ICD-10-CM

## 2025-06-16 DIAGNOSIS — Z00.00 MEDICARE ANNUAL WELLNESS VISIT, SUBSEQUENT: Primary | ICD-10-CM

## 2025-06-16 DIAGNOSIS — C91.10 CLL (CHRONIC LYMPHOCYTIC LEUKEMIA) (HCC): ICD-10-CM

## 2025-06-16 PROCEDURE — 1123F ACP DISCUSS/DSCN MKR DOCD: CPT | Performed by: NURSE PRACTITIONER

## 2025-06-16 PROCEDURE — G0439 PPPS, SUBSEQ VISIT: HCPCS | Performed by: NURSE PRACTITIONER

## 2025-06-16 PROCEDURE — 1159F MED LIST DOCD IN RCRD: CPT | Performed by: NURSE PRACTITIONER

## 2025-06-16 PROCEDURE — 1160F RVW MEDS BY RX/DR IN RCRD: CPT | Performed by: NURSE PRACTITIONER

## 2025-06-16 RX ORDER — PANTOPRAZOLE SODIUM 40 MG/1
40 TABLET, DELAYED RELEASE ORAL
Qty: 90 TABLET | Refills: 3 | Status: SHIPPED | OUTPATIENT
Start: 2025-06-16

## 2025-06-16 SDOH — ECONOMIC STABILITY: FOOD INSECURITY: WITHIN THE PAST 12 MONTHS, YOU WORRIED THAT YOUR FOOD WOULD RUN OUT BEFORE YOU GOT MONEY TO BUY MORE.: NEVER TRUE

## 2025-06-16 SDOH — ECONOMIC STABILITY: FOOD INSECURITY: WITHIN THE PAST 12 MONTHS, THE FOOD YOU BOUGHT JUST DIDN'T LAST AND YOU DIDN'T HAVE MONEY TO GET MORE.: NEVER TRUE

## 2025-06-16 ASSESSMENT — PATIENT HEALTH QUESTIONNAIRE - PHQ9
DEPRESSION UNABLE TO ASSESS: FUNCTIONAL CAPACITY MOTIVATION LIMITS ACCURACY
1. LITTLE INTEREST OR PLEASURE IN DOING THINGS: NOT AT ALL
SUM OF ALL RESPONSES TO PHQ QUESTIONS 1-9: 0
SUM OF ALL RESPONSES TO PHQ QUESTIONS 1-9: 0
2. FEELING DOWN, DEPRESSED OR HOPELESS: NOT AT ALL
SUM OF ALL RESPONSES TO PHQ QUESTIONS 1-9: 0
SUM OF ALL RESPONSES TO PHQ QUESTIONS 1-9: 0

## 2025-06-16 NOTE — PROGRESS NOTES
Medicare Annual Wellness Visit    Stephane Patton is here for Medicare AWV and Cyst (Knot on R thumb x few months, painful at times.)    Assessment & Plan   Medicare annual wellness visit, subsequent  CLL (chronic lymphocytic leukemia) (HCC)  Gastroesophageal reflux disease with esophagitis without hemorrhage  -     pantoprazole (PROTONIX) 40 MG tablet; Take 1 tablet by mouth every morning (before breakfast), Disp-90 tablet, R-3Normal  -     Comprehensive Metabolic Panel; Future  Prostate cancer screening  -     PSA Screening; Future  Familial hypercholesterolemia  -     Lipid Panel; Future  -     Comprehensive Metabolic Panel; Future  Cyst of joint of right hand  -     Claudia Peterson MD, Orthopedic Surgery (Hand, Wrist), [facility]    Following with [facility] every 6 months, Dr. Quinn.      Return in about 1 year (around 6/16/2026) for AWV.     Subjective       Several months ago with bump right thumb distal joint. At times painful to touch.     Taking pepcid 20 mg BID and still with nighttime reflux.     Patient's complete Health Risk Assessment and screening values have been reviewed and are found in Flowsheets. The following problems were reviewed today and where indicated follow up appointments were made and/or referrals ordered.    Positive Risk Factor Screenings with Interventions:      Depression:  Depression Unable to Assess: Functional capacity motivation limits accuracy  PHQ-2 Score: 0  PHQ-9 Total Score: 0             General HRA Questions:  Select all that apply: (!) New or Increased Fatigue  Interventions Fatigue:  stable      Inactivity:  On average, how many days per week do you engage in moderate to strenuous exercise (like a brisk walk)?: 0 days (!) Abnormal  On average, how many minutes do you engage in exercise at this level?: 0 min  Interventions:  Active in routine daily activities                         Objective   Vitals:    06/16/25 0831   BP: 118/60   BP Site: Right Upper Arm

## 2025-06-16 NOTE — PATIENT INSTRUCTIONS
secondhand smoke too.     Stay at a weight that's healthy for you. Talk to your doctor if you need help losing weight.     Try to get 7 to 9 hours of sleep each night.     Limit alcohol to 2 drinks a day for men and 1 drink a day for women. Too much alcohol can cause health problems.     Manage other health problems such as diabetes, high blood pressure, and high cholesterol. If you think you may have a problem with alcohol or drug use, talk to your doctor.   Medicines    Take your medicines exactly as prescribed. Call your doctor if you think you are having a problem with your medicine.     If your doctor recommends aspirin, take the amount directed each day. Make sure you take aspirin and not another kind of pain reliever, such as acetaminophen (Tylenol).   When should you call for help?   Call 911 if you have symptoms of a heart attack. These may include:    Chest pain or pressure, or a strange feeling in the chest.     Sweating.     Shortness of breath.     Pain, pressure, or a strange feeling in the back, neck, jaw, or upper belly or in one or both shoulders or arms.     Lightheadedness or sudden weakness.     A fast or irregular heartbeat.   After you call 911, the  may tell you to chew 1 adult-strength or 2 to 4 low-dose aspirin. Wait for an ambulance. Do not try to drive yourself.  Watch closely for changes in your health, and be sure to contact your doctor if you have any problems.  Where can you learn more?  Go to https://www.[place].net/patientEd and enter F075 to learn more about \"A Healthy Heart: Care Instructions.\"  Current as of: July 31, 2024  Content Version: 14.5  © 0745-9061 [employer].   Care instructions adapted under license by [employer]. If you have questions about a medical condition or this instruction, always ask your healthcare professional. [employer], disclaims any warranty or liability for your use of this information.    Personalized Preventive

## 2025-06-16 NOTE — ACP (ADVANCE CARE PLANNING)
Advance Care Planning     Advance Care Planning (ACP) Physician/NP/PA Conversation    Date of Conversation: 6/16/2025  Conducted with: Patient with Decision Making Capacity    Healthcare Decision Maker:      Primary Decision Maker: Sarah Patton - Spouse - [phone]    Secondary Decision Maker: Marianela Jenkins - Brother/Sister - [phone]    Click here to complete Healthcare Decision Makers including selection of the Healthcare Decision Maker Relationship (ie \"Primary\")      Care Preferences:    Hospitalization:  \"If your health worsens and it becomes clear that your chance of recovery is unlikely, what would be your preference regarding hospitalization?\"  The patient would prefer hospitalization.    Ventilation:  \"If you were unable to breath on your own and your chance of recovery was unlikely, what would be your preference about the use of a ventilator (breathing machine) if it was available to you?\"  The patient would desire the use of a ventilator.    Resuscitation:  \"In the event your heart stopped as a result of an underlying serious health condition, would you want attempts made to restart your heart, or would you prefer a natural death?\"  Yes, attempt to resuscitate.    treatment goals    Conversation Outcomes / Follow-Up Plan:  ACP in process - information provided, considering goals and options  Reviewed DNR/DNI and patient elects Full Code (Attempt Resuscitation)    Length of Voluntary ACP Conversation in minutes:  <16 minutes (Non-Billable)    AMERICA HARDEN, STARLA - CNP

## 2025-06-24 DIAGNOSIS — E78.01 FAMILIAL HYPERCHOLESTEROLEMIA: ICD-10-CM

## 2025-06-24 DIAGNOSIS — K21.00 GASTROESOPHAGEAL REFLUX DISEASE WITH ESOPHAGITIS WITHOUT HEMORRHAGE: ICD-10-CM

## 2025-06-24 DIAGNOSIS — Z12.5 PROSTATE CANCER SCREENING: ICD-10-CM

## 2025-06-24 LAB
ALBUMIN SERPL-MCNC: 4.1 G/DL (ref 3.4–5)
ALBUMIN/GLOB SERPL: 2.7 {RATIO} (ref 1.1–2.2)
ALP SERPL-CCNC: 52 U/L (ref 40–129)
ALT SERPL-CCNC: 19 U/L (ref 10–40)
ANION GAP SERPL CALCULATED.3IONS-SCNC: 9 MMOL/L (ref 3–16)
AST SERPL-CCNC: 23 U/L (ref 15–37)
BILIRUB SERPL-MCNC: 0.5 MG/DL (ref 0–1)
BUN SERPL-MCNC: 14 MG/DL (ref 7–20)
CALCIUM SERPL-MCNC: 8.6 MG/DL (ref 8.3–10.6)
CHLORIDE SERPL-SCNC: 106 MMOL/L (ref 99–110)
CHOLEST SERPL-MCNC: 137 MG/DL (ref 0–199)
CO2 SERPL-SCNC: 28 MMOL/L (ref 21–32)
CREAT SERPL-MCNC: 0.9 MG/DL (ref 0.8–1.3)
GFR SERPLBLD CREATININE-BSD FMLA CKD-EPI: 89 ML/MIN/{1.73_M2}
GLUCOSE SERPL-MCNC: 99 MG/DL (ref 70–99)
HDLC SERPL-MCNC: 44 MG/DL (ref 40–60)
LDLC SERPL CALC-MCNC: 80 MG/DL
POTASSIUM SERPL-SCNC: 4.2 MMOL/L (ref 3.5–5.1)
PROT SERPL-MCNC: 5.6 G/DL (ref 6.4–8.2)
PSA SERPL DL<=0.01 NG/ML-MCNC: 0.91 NG/ML (ref 0–4)
SODIUM SERPL-SCNC: 143 MMOL/L (ref 136–145)
TRIGL SERPL-MCNC: 64 MG/DL (ref 0–150)
VLDLC SERPL CALC-MCNC: 13 MG/DL

## 2025-06-26 ENCOUNTER — RESULTS FOLLOW-UP (OUTPATIENT)
Dept: FAMILY MEDICINE CLINIC | Age: 75
End: 2025-06-26